# Patient Record
Sex: MALE | Race: OTHER | HISPANIC OR LATINO | URBAN - METROPOLITAN AREA
[De-identification: names, ages, dates, MRNs, and addresses within clinical notes are randomized per-mention and may not be internally consistent; named-entity substitution may affect disease eponyms.]

---

## 2024-09-30 ENCOUNTER — EMERGENCY (EMERGENCY)
Facility: HOSPITAL | Age: 24
LOS: 1 days | Discharge: HOME | End: 2024-09-30
Attending: EMERGENCY MEDICINE | Admitting: EMERGENCY MEDICINE
Payer: MEDICAID

## 2024-09-30 VITALS
RESPIRATION RATE: 18 BRPM | WEIGHT: 184.97 LBS | HEART RATE: 83 BPM | TEMPERATURE: 98 F | DIASTOLIC BLOOD PRESSURE: 84 MMHG | SYSTOLIC BLOOD PRESSURE: 135 MMHG | OXYGEN SATURATION: 98 %

## 2024-09-30 PROCEDURE — 99285 EMERGENCY DEPT VISIT HI MDM: CPT | Mod: 25

## 2024-09-30 NOTE — ED ADULT TRIAGE NOTE - CHIEF COMPLAINT QUOTE
Patient c/o dizziness, chest discomfort and HA x few months, worse today. Denies SOB, syncope, fevers and chills. No hx.

## 2024-10-01 LAB
ALBUMIN SERPL ELPH-MCNC: 4.3 G/DL — SIGNIFICANT CHANGE UP (ref 3.3–5)
ALP SERPL-CCNC: 71 U/L — SIGNIFICANT CHANGE UP (ref 40–120)
ALT FLD-CCNC: 20 U/L — SIGNIFICANT CHANGE UP (ref 4–41)
ANION GAP SERPL CALC-SCNC: 12 MMOL/L — SIGNIFICANT CHANGE UP (ref 7–14)
AST SERPL-CCNC: 26 U/L — SIGNIFICANT CHANGE UP (ref 4–40)
BASOPHILS # BLD AUTO: 0.04 K/UL — SIGNIFICANT CHANGE UP (ref 0–0.2)
BASOPHILS NFR BLD AUTO: 0.7 % — SIGNIFICANT CHANGE UP (ref 0–2)
BILIRUB SERPL-MCNC: 0.3 MG/DL — SIGNIFICANT CHANGE UP (ref 0.2–1.2)
BUN SERPL-MCNC: 12 MG/DL — SIGNIFICANT CHANGE UP (ref 7–23)
CALCIUM SERPL-MCNC: 9 MG/DL — SIGNIFICANT CHANGE UP (ref 8.4–10.5)
CHLORIDE SERPL-SCNC: 103 MMOL/L — SIGNIFICANT CHANGE UP (ref 98–107)
CO2 SERPL-SCNC: 22 MMOL/L — SIGNIFICANT CHANGE UP (ref 22–31)
CREAT SERPL-MCNC: 0.92 MG/DL — SIGNIFICANT CHANGE UP (ref 0.5–1.3)
EGFR: 119 ML/MIN/1.73M2 — SIGNIFICANT CHANGE UP
EOSINOPHIL # BLD AUTO: 0.12 K/UL — SIGNIFICANT CHANGE UP (ref 0–0.5)
EOSINOPHIL NFR BLD AUTO: 2 % — SIGNIFICANT CHANGE UP (ref 0–6)
GLUCOSE SERPL-MCNC: 89 MG/DL — SIGNIFICANT CHANGE UP (ref 70–99)
HCT VFR BLD CALC: 39.5 % — SIGNIFICANT CHANGE UP (ref 39–50)
HGB BLD-MCNC: 13.8 G/DL — SIGNIFICANT CHANGE UP (ref 13–17)
IANC: 2.85 K/UL — SIGNIFICANT CHANGE UP (ref 1.8–7.4)
IMM GRANULOCYTES NFR BLD AUTO: 0.5 % — SIGNIFICANT CHANGE UP (ref 0–0.9)
LYMPHOCYTES # BLD AUTO: 2.4 K/UL — SIGNIFICANT CHANGE UP (ref 1–3.3)
LYMPHOCYTES # BLD AUTO: 40.2 % — SIGNIFICANT CHANGE UP (ref 13–44)
MAGNESIUM SERPL-MCNC: 2.3 MG/DL — SIGNIFICANT CHANGE UP (ref 1.6–2.6)
MCHC RBC-ENTMCNC: 28.1 PG — SIGNIFICANT CHANGE UP (ref 27–34)
MCHC RBC-ENTMCNC: 34.9 GM/DL — SIGNIFICANT CHANGE UP (ref 32–36)
MCV RBC AUTO: 80.4 FL — SIGNIFICANT CHANGE UP (ref 80–100)
MONOCYTES # BLD AUTO: 0.53 K/UL — SIGNIFICANT CHANGE UP (ref 0–0.9)
MONOCYTES NFR BLD AUTO: 8.9 % — SIGNIFICANT CHANGE UP (ref 2–14)
NEUTROPHILS # BLD AUTO: 2.85 K/UL — SIGNIFICANT CHANGE UP (ref 1.8–7.4)
NEUTROPHILS NFR BLD AUTO: 47.7 % — SIGNIFICANT CHANGE UP (ref 43–77)
NRBC # BLD: 0 /100 WBCS — SIGNIFICANT CHANGE UP (ref 0–0)
NRBC # FLD: 0 K/UL — SIGNIFICANT CHANGE UP (ref 0–0)
PLATELET # BLD AUTO: 229 K/UL — SIGNIFICANT CHANGE UP (ref 150–400)
POTASSIUM SERPL-MCNC: 3.8 MMOL/L — SIGNIFICANT CHANGE UP (ref 3.5–5.3)
POTASSIUM SERPL-SCNC: 3.8 MMOL/L — SIGNIFICANT CHANGE UP (ref 3.5–5.3)
PROT SERPL-MCNC: 6.4 G/DL — SIGNIFICANT CHANGE UP (ref 6–8.3)
RBC # BLD: 4.91 M/UL — SIGNIFICANT CHANGE UP (ref 4.2–5.8)
RBC # FLD: 12.4 % — SIGNIFICANT CHANGE UP (ref 10.3–14.5)
SODIUM SERPL-SCNC: 137 MMOL/L — SIGNIFICANT CHANGE UP (ref 135–145)
TROPONIN T, HIGH SENSITIVITY RESULT: 14 NG/L — SIGNIFICANT CHANGE UP
TROPONIN T, HIGH SENSITIVITY RESULT: <6 NG/L — SIGNIFICANT CHANGE UP
TSH SERPL-MCNC: 2.89 UIU/ML — SIGNIFICANT CHANGE UP (ref 0.27–4.2)
WBC # BLD: 5.97 K/UL — SIGNIFICANT CHANGE UP (ref 3.8–10.5)
WBC # FLD AUTO: 5.97 K/UL — SIGNIFICANT CHANGE UP (ref 3.8–10.5)

## 2024-10-01 PROCEDURE — 93010 ELECTROCARDIOGRAM REPORT: CPT

## 2024-10-01 PROCEDURE — 71046 X-RAY EXAM CHEST 2 VIEWS: CPT | Mod: 26

## 2024-10-01 RX ORDER — SODIUM CHLORIDE 9 MG/ML
1000 INJECTION INTRAMUSCULAR; INTRAVENOUS; SUBCUTANEOUS ONCE
Refills: 0 | Status: COMPLETED | OUTPATIENT
Start: 2024-10-01 | End: 2024-10-01

## 2024-10-01 RX ADMIN — SODIUM CHLORIDE 1000 MILLILITER(S): 9 INJECTION INTRAMUSCULAR; INTRAVENOUS; SUBCUTANEOUS at 00:13

## 2024-10-01 NOTE — ED PROVIDER NOTE - NSFOLLOWUPINSTRUCTIONS_ED_ALL_ED_FT
You were seen in the ED today for chest discomfort, shortness of breath.     Your work up included chest xray, blood work, EKG. Your results are included in your paperwork.    Your blood work was otherwise normal. Your EKG showed no acute concerning signs.     Your symptoms are likely due to panic attack symptoms but you should get evaluated by a cardiologist outpatient. Our clinic information is attached.     Follow up with your primary physician in 1-2 days. If needed call [phone] to find a primary care physician or call  [phone] to schedule an appointment with the general medicine.       If you experience any of the following please return to the ED:  - Chest pain  - Trouble breathing   - Pass out or loss consciousness    Please follow up at the [facility] which is located at:     [address] [place] [address] [phone]    please call to schedule an appointment but they also have walk in hours during the weekday between the hours of 9am to 5pm so you can walk in to see if you can see a psychiatrist then.     1. TAKE ALL MEDICATIONS AS DIRECTED.    2. FOR PAIN OR FEVER YOU CAN TAKE IBUPROFEN (MOTRIN, ADVIL) OR ACETAMINOPHEN (TYLENOL) AS NEEDED, AS DIRECTED ON PACKAGING.  3. FOLLOW UP WITH YOUR PRIMARY DOCTOR WITHIN 5 DAYS AS DIRECTED.  4. IF YOU HAD LABS OR IMAGING DONE, YOU WERE GIVEN COPIES OF ALL LABS AND/OR IMAGING RESULTS FROM YOUR ER VISIT--PLEASE TAKE THEM WITH YOU TO YOUR FOLLOW UP APPOINTMENTS.  5. RETURN TO THE ER FOR ANY WORSENING SYMPTOMS OR CONCERNS.

## 2024-10-01 NOTE — ED PROVIDER NOTE - PATIENT PORTAL LINK FT
You can access the FollowMyHealth Patient Portal offered by [facility] by registering at the following website: [email or web address]. By joining [facility]’s FollowMyHealth portal, you will also be able to view your health information using other applications (apps) compatible with our system.

## 2024-10-01 NOTE — ED PROVIDER NOTE - ST/T WAVE
EKG without ischemic changes. No evidence of Brugada, WPW, AVNRT, tachydysrhythmia, heart block, HOCM; normal intervals without QTC prolongation or other red flag syncope findings.

## 2024-10-01 NOTE — ED PROVIDER NOTE - PHYSICAL EXAMINATION
GEN: Patient awake and alert. No acute distress, non-toxic. Well appearing.   Head: Normocephalic, atraumatic.  Neck: Nontender, full ROM.   Eyes: PERRLA b/l. EOMI, no scleral icterus, no conjunctival injection. Moist mucous membranes.  CARDIAC: RRR. Normal S1, S2. No murmur, rubs, or gallops. No peripheral edema noted.  PULM: Speaking in full sentences. CTA B/L no wheeze, rales or rhonchi. No signs of respiratory distress, no accessory muscle usage or nasal flaring.  ABD: Soft, nontender, nondistended. No rebound, no involuntary guarding.   NEURO: A&Ox3, no focal neurological deficits, CN 2-12 grossly intact. Following simple commands. FTN and HTS coordination intact. Gait normal.  SKIN: Warm, dry, no rash, no lesions, no open wounds. No urticaria. No jaundice.

## 2024-10-01 NOTE — ED ADULT NURSE NOTE - OBJECTIVE STATEMENT
Patient received in intake 6, A&Ox3 ambulatory at baseline presenting to ED c/o dizziness x Patient received in intake 6, A&Ox3 ambulatory at baseline presenting to ED c/o intermittent episodes of dizziness and chest discomfort x months especially while on trains. Denies  CP, SOB, n/v/d, fever chills, abdominal pain, visual changes, urinary symptoms. Left 20g placed labs drawn and sent.

## 2024-10-01 NOTE — ED PROVIDER NOTE - ATTENDING CONTRIBUTION TO CARE
Lily Ordonez MD attending physician 24-year-old gentleman who has episodes of dizziness and chest discomfort and feeling unwell that he specifically notices when he is on trains.  Denies fever chills nausea vomiting diarrhea abdominal pain.  He has never been diagnosed with any anxiety disorders or heart problems.    Vital signs here include blood pressure 135/84 respiratory rate 18 heart rate 83 temp 97 O2 sat is 98.    Pt alert and can phonate well  h at/nc  perrl, conj clear, sclera anicteric,  neck supple  abd soft no r/g/t  ext no edema no deformities no tremor  neueo awake, lucid normal gait moves all extremities with strength  psych appears anxious  vs reasonable    Will do baseline EKG lab work including TSH.  If all of those are okay patient can be discharged follow-up outpatient with cardiology would also suggest follow-up with therapist to address anxiety concerns as well    I performed a history and physical exam of the patient and discussed their management with the resident and /or advanced care provider. I personally made/approved the management plan and take responsibility for the patient management. I reviewed the resident and /or ACP's note and agree with the documented findings and plan of care. My medical decison making and observations are found above.

## 2024-10-01 NOTE — ED PROVIDER NOTE - NSFOLLOWUPCLINICS_GEN_ALL_ED_FT
Cardiology at [facility]  Cardiology  [address] [place] [id number]  Phone: [phone]  Fax:   Follow Up Time: 7-10 Days    Cardiology at [facility]  Cardiology  [address] [place] [address]  Phone: [phone]  Fax:   Follow Up Time: 7-10 Days

## 2024-10-01 NOTE — ED PROVIDER NOTE - CLINICAL SUMMARY MEDICAL DECISION MAKING FREE TEXT BOX
Lily Ordonez MD attending physician 24-year-old gentleman who has episodes of dizziness and chest discomfort and feeling unwell that he specifically notices when he is on trains.  Denies fever chills nausea vomiting diarrhea abdominal pain.  He has never been diagnosed with any anxiety disorders or heart problems.    Vital signs here include blood pressure 135/84 respiratory rate 18 heart rate 83 temp 97 O2 sat is 98.    Pt alert and can phonate well  h at/nc  perrl, conj clear, sclera anicteric,  neck supple  abd soft no r/g/t  ext no edema no deformities no tremor  neueo awake, lucid normal gait moves all extremities with strength  psych appears anxious  vs reasonable    Will do baseline EKG lab work including TSH.  If all of those are okay patient can be discharged follow-up outpatient with cardiology would also suggest follow-up with therapist to address anxiety concerns as well

## 2024-10-01 NOTE — ED PROVIDER NOTE - OBJECTIVE STATEMENT
24-year-old male with no signal past med history presenting to the emergency department for episode of dizziness chest discomfort and generalized malaise, near syncope in the setting of riding a train.  Patient reports when he gets on trains he typically has similar symptoms.  He has had them many times in the past only when on trains typically when entering tunnels.  Also associated with heart racing palpitations and sensation.  He has no family history of early cardiac disease, sudden death or arrhythmias.  Denies any associate fevers chills, cough.  No focal weakness, slurred speech, changes to mental status. No psych, anxiety, BH hx.

## 2024-10-01 NOTE — ED ADULT NURSE NOTE - NSFALLUNIVINTERV_ED_ALL_ED
Bed/Stretcher in lowest position, wheels locked, appropriate side rails in place/Call bell, personal items and telephone in reach/Instruct patient to call for assistance before getting out of bed/chair/stretcher/Non-slip footwear applied when patient is off stretcher/[place] to call system/Physically safe environment - no spills, clutter or unnecessary equipment/Purposeful proactive rounding/Room/bathroom lighting operational, light cord in reach

## 2024-10-01 NOTE — ED PROVIDER NOTE - PROGRESS NOTE DETAILS
Nate Pham MD PGY2: Patient reassessed, stable. EKG wnl. No events on tele. CXR clear. Labs nonactionable. Initial trop 14 with repeat to 6. No further events in ED. Neuro exam unchanged. To be discharged with PCP, cards, and crisis center info. Lab and imaging results were discussed with the patient. Shared decision making was held between provider and patient with regards to disposition decision. All questions and concerns were addressed. Patient is agreeable to disposition plan.